# Patient Record
Sex: FEMALE | Race: WHITE | NOT HISPANIC OR LATINO
[De-identification: names, ages, dates, MRNs, and addresses within clinical notes are randomized per-mention and may not be internally consistent; named-entity substitution may affect disease eponyms.]

---

## 2022-10-26 DIAGNOSIS — Z86.39 PERSONAL HISTORY OF OTHER ENDOCRINE, NUTRITIONAL AND METABOLIC DISEASE: ICD-10-CM

## 2022-10-26 PROBLEM — Z00.00 ENCOUNTER FOR PREVENTIVE HEALTH EXAMINATION: Status: ACTIVE | Noted: 2022-10-26

## 2022-11-10 ENCOUNTER — NON-APPOINTMENT (OUTPATIENT)
Age: 72
End: 2022-11-10

## 2022-11-10 DIAGNOSIS — E66.9 OBESITY, UNSPECIFIED: ICD-10-CM

## 2022-11-10 DIAGNOSIS — K58.9 IRRITABLE BOWEL SYNDROME W/OUT DIARRHEA: ICD-10-CM

## 2023-03-10 ENCOUNTER — APPOINTMENT (OUTPATIENT)
Dept: ENDOCRINOLOGY | Facility: CLINIC | Age: 73
End: 2023-03-10
Payer: MEDICARE

## 2023-03-10 VITALS
OXYGEN SATURATION: 98 % | BODY MASS INDEX: 25.1 KG/M2 | WEIGHT: 147 LBS | RESPIRATION RATE: 18 BRPM | SYSTOLIC BLOOD PRESSURE: 100 MMHG | HEIGHT: 64 IN | HEART RATE: 69 BPM | DIASTOLIC BLOOD PRESSURE: 66 MMHG

## 2023-03-10 PROCEDURE — 99213 OFFICE O/P EST LOW 20 MIN: CPT

## 2023-03-10 NOTE — PHYSICAL EXAM
[Alert] : alert [Well Nourished] : well nourished [No Acute Distress] : no acute distress [Well Developed] : well developed [Normal Sclera/Conjunctiva] : normal sclera/conjunctiva [EOMI] : extra ocular movement intact [No Proptosis] : no proptosis [Normal Oropharynx] : the oropharynx was normal [Thyroid Not Enlarged] : the thyroid was not enlarged [No Thyroid Nodules] : no palpable thyroid nodules [No Respiratory Distress] : no respiratory distress [No Accessory Muscle Use] : no accessory muscle use [Clear to Auscultation] : lungs were clear to auscultation bilaterally [Normal S1, S2] : normal S1 and S2 [Normal Rate] : heart rate was normal [Regular Rhythm] : with a regular rhythm [No Edema] : no peripheral edema [Normal Bowel Sounds] : normal bowel sounds [Not Tender] : non-tender [Not Distended] : not distended [Soft] : abdomen soft [Normal Anterior Cervical Nodes] : no anterior cervical lymphadenopathy [Normal Posterior Cervical Nodes] : no posterior cervical lymphadenopathy [No Spinal Tenderness] : no spinal tenderness [Spine Straight] : spine straight [No Stigmata of Cushings Syndrome] : no stigmata of Cushings Syndrome [Normal Gait] : normal gait [Normal Strength/Tone] : muscle strength and tone were normal [No Rash] : no rash [Normal Reflexes] : deep tendon reflexes were 2+ and symmetric [No Tremors] : no tremors [Oriented x3] : oriented to person, place, and time [Acanthosis Nigricans] : no acanthosis nigricans [de-identified] : Distal pulses are 1+ bilaterally on the lower extremities

## 2023-03-10 NOTE — HISTORY OF PRESENT ILLNESS
[FreeTextEntry1] : 72-year-old white female with a past medical history of type 2 diabetes primary hypothyroidism, hypertension and hyperlipidemia presents for routine follow-up and evaluation.  Patient is currently taking glimepiride 2 mg tablets in the evening Ozempic 0.5 mg subcutaneously once a week levothyroxine 75 mcg tablets every day.  Patient denies any significant symptoms of polyuria or polydipsia.  Her glucose average for 7 days is 115 mg per DL 14-day average is 122 and 30-day average is 115 mg per DL.  Patient occasionally experiences hypoglycemi hypoglycemic episodes but now since she is off the  glimepiride in the morning she is feeling better review of systems essentially negative she denies any chest pain shortness of breath heartburn.  She recently had a cardiac evaluation which was reported to be normal.  Physically she is quite active and her vision has been stable.

## 2023-03-10 NOTE — ASSESSMENT
[Diabetes Foot Care] : diabetes foot care [Long Term Vascular Complications] : long term vascular complications of diabetes [Carbohydrate Consistent Diet] : carbohydrate consistent diet [Importance of Diet and Exercise] : importance of diet and exercise to improve glycemic control, achieve weight loss and improve cardiovascular health [Exercise/Effect on Glucose] : exercise/effect on glucose [Hypoglycemia Management] : hypoglycemia management [Self Monitoring of Blood Glucose] : self monitoring of blood glucose [Retinopathy Screening] : Patient was referred to ophthalmology for retinopathy screening [FreeTextEntry1] : Middle-aged white female who has a past medical history of type 2 diabetes and primary hypothyroidism who is currently stable.  She recently had a blood work done in December 2022 her hemoglobin A1c level was 6.2% urine for albumin was less than 3 complete metabolic panel is normal except for a creatinine of 1.18 her GFR is 48 lipid panel shows a total cholesterol of 171 and LDL of 86.  Recommendation\par 1.  I discussed in detail with the patient the results of her recent blood test.  She is soon due to have repeat blood analysis which should be including a TSH level.\par 2.  I have advised the patient to continue with the Ozempic 0.5 mg every week and the glimepiride 2 mg tablets every evening..  If the patient glucose level continue to drop then we may have to discontinue the glimepiride dose in the evening.\par 3.  The importance of diet and exercise was stressed upon the patient.\par 4.  Patient will return to the office in approximately 4 months time after repeat blood test.  The plan was discussed in detail with the patient thank you

## 2023-04-25 ENCOUNTER — OFFICE (OUTPATIENT)
Dept: URBAN - METROPOLITAN AREA CLINIC 105 | Facility: CLINIC | Age: 73
Setting detail: OPHTHALMOLOGY
End: 2023-04-25
Payer: MEDICARE

## 2023-04-25 DIAGNOSIS — H16.223: ICD-10-CM

## 2023-04-25 DIAGNOSIS — H90.3: ICD-10-CM

## 2023-04-25 DIAGNOSIS — Z96.1: ICD-10-CM

## 2023-04-25 DIAGNOSIS — E11.9: ICD-10-CM

## 2023-04-25 DIAGNOSIS — H43.393: ICD-10-CM

## 2023-04-25 DIAGNOSIS — H40.1131: ICD-10-CM

## 2023-04-25 PROCEDURE — 92567 TYMPANOMETRY: CPT | Performed by: AUDIOLOGIST-HEARING AID FITTER

## 2023-04-25 PROCEDURE — 92014 COMPRE OPH EXAM EST PT 1/>: CPT | Performed by: OPTOMETRIST

## 2023-04-25 PROCEDURE — 92133 CPTRZD OPH DX IMG PST SGM ON: CPT | Performed by: OPTOMETRIST

## 2023-04-25 PROCEDURE — 92557 COMPREHENSIVE HEARING TEST: CPT | Performed by: AUDIOLOGIST-HEARING AID FITTER

## 2023-04-25 ASSESSMENT — REFRACTION_AUTOREFRACTION
OD_AXIS: 152
OS_AXIS: 006
OS_SPHERE: +1.75
OS_CYLINDER: -2.75
OD_CYLINDER: -2.75
OD_SPHERE: +2.00

## 2023-04-25 ASSESSMENT — REFRACTION_MANIFEST
OD_SPHERE: +2.00
OD_AXIS: 154
OD_VA1: 20/20
OD_CYLINDER: -2.75
OS_SPHERE: +2.00
OS_AXIS: 007
OS_ADD: +2.75
OS_CYLINDER: -2.50
OD_ADD: +2.75
OS_VA1: 20/20

## 2023-04-25 ASSESSMENT — KERATOMETRY
OS_K1POWER_DIOPTERS: 44.00
OS_K2POWER_DIOPTERS: 47.00
OD_K2POWER_DIOPTERS: 46.00
OD_K1POWER_DIOPTERS: 43.25
OS_AXISANGLE_DEGREES: 097
OD_AXISANGLE_DEGREES: 069

## 2023-04-25 ASSESSMENT — SPHEQUIV_DERIVED
OD_SPHEQUIV: 0.625
OS_SPHEQUIV: 0.75
OD_SPHEQUIV: 0.625
OS_SPHEQUIV: 0.375

## 2023-04-25 ASSESSMENT — AXIALLENGTH_DERIVED
OS_AL: 22.6079
OS_AL: 22.7429
OD_AL: 22.953
OD_AL: 22.953

## 2023-04-25 ASSESSMENT — CORNEAL DYSTROPHY - POSTERIOR
OD_POSTERIORDYSTROPHY: T GUTTATA
OS_POSTERIORDYSTROPHY: T GUTTATA

## 2023-04-25 ASSESSMENT — CONFRONTATIONAL VISUAL FIELD TEST (CVF)
OS_FINDINGS: FULL
OD_FINDINGS: FULL

## 2023-04-25 ASSESSMENT — REFRACTION_CURRENTRX
OS_CYLINDER: -2.25
OD_SPHERE: +2.00
OD_ADD: +3.00
OD_AXIS: 155
OS_ADD: +2.75
OS_SPHERE: +1.75
OD_AXIS: 151
OS_OVR_VA: 20/
OD_SPHERE: +2.00
OS_VPRISM_DIRECTION: BF
OS_ADD: +3.00
OS_CYLINDER: -2.75
OS_OVR_VA: 20/
OD_ADD: +2.75
OD_AXIS: 150
OS_AXIS: 003
OD_ADD: +3.00
OD_VPRISM_DIRECTION: BF
OS_AXIS: 001
OD_OVR_VA: 20/
OS_VPRISM_DIRECTION: BF
OD_CYLINDER: -2.75
OD_SPHERE: +1.75
OD_OVR_VA: 20/
OS_VPRISM_DIRECTION: BF
OD_OVR_VA: 20/
OS_ADD: +3.00
OD_VPRISM_DIRECTION: BF
OS_SPHERE: +1.75
OD_CYLINDER: -2.50
OD_VPRISM_DIRECTION: BF
OD_CYLINDER: -2.75
OS_CYLINDER: -2.75
OS_AXIS: 005
OS_SPHERE: +2.00
OS_OVR_VA: 20/

## 2023-04-25 ASSESSMENT — VISUAL ACUITY
OD_BCVA: 20/25-1
OS_BCVA: 20/20

## 2023-04-25 ASSESSMENT — PACHYMETRY
OS_CT_UM: 545
OS_CT_CORRECTION: 0
OD_CT_UM: 522
OD_CT_CORRECTION: 1

## 2023-04-25 ASSESSMENT — SUPERFICIAL PUNCTATE KERATITIS (SPK)
OS_SPK: ABSENT
OD_SPK: ABSENT

## 2023-04-25 ASSESSMENT — TONOMETRY
OS_IOP_MMHG: 14
OD_IOP_MMHG: 14

## 2023-05-02 ENCOUNTER — APPOINTMENT (OUTPATIENT)
Dept: ORTHOPEDIC SURGERY | Facility: CLINIC | Age: 73
End: 2023-05-02
Payer: MEDICARE

## 2023-05-02 VITALS — HEIGHT: 64 IN | WEIGHT: 143 LBS | BODY MASS INDEX: 24.41 KG/M2

## 2023-05-02 DIAGNOSIS — M48.00 SPINAL STENOSIS, SITE UNSPECIFIED: ICD-10-CM

## 2023-05-02 DIAGNOSIS — Z78.9 OTHER SPECIFIED HEALTH STATUS: ICD-10-CM

## 2023-05-02 DIAGNOSIS — S39.012A STRAIN OF MUSCLE, FASCIA AND TENDON OF LOWER BACK, INITIAL ENCOUNTER: ICD-10-CM

## 2023-05-02 PROCEDURE — 99204 OFFICE O/P NEW MOD 45 MIN: CPT

## 2023-05-02 PROCEDURE — 73562 X-RAY EXAM OF KNEE 3: CPT | Mod: RT

## 2023-05-02 NOTE — DISCUSSION/SUMMARY
[de-identified] : Patient pain is coming from her back\par patient does not have pain in the hip joint\par POWER and TKA are not recommended at this time\par Recommend PT for stretching and strengthening

## 2023-05-02 NOTE — PHYSICAL EXAM
[5___] : hamstring 5[unfilled]/5 [] : patient ambulates without assistive device [AP] : anteroposterior [Right] : right knee [Lateral] : lateral [[place]] : skyline [FreeTextEntry8] : tender over gluteal musculature  [FreeTextEntry3] : minimally swollen [de-identified] : waist flexed gait [FreeTextEntry9] : no significant bony abnormalities or osteophytes  [TWNoteComboBox7] : flexion 130 degrees [de-identified] : extension 0 degrees

## 2023-05-02 NOTE — HISTORY OF PRESENT ILLNESS
[de-identified] : Date of Injury/Onset:      1/2023\par Pain: At Rest: 5 /10   \par With Activity:8 /10 \par Affecting Sleep:Y\par Difficulty with stairs:Y\par Difficulty getting in and out of car:Y\par Sit to stand stiffness:Y\par Mechanism of injury:  NKI\par This  is not a Work Related Injury being treated under Worker's Compensation.\par This  is not   an athletic injury occurring associated with an interscholastic or organized sports team.\par Quality of symptoms: C/O PAIN LATERAL SIDED PAIN AND POSTERIOR PAIN, NO SWELLING , INSTABILITY\par Improves with:    HEAT\par Worse with:    DRIVING, TURNING IN BED\par Previous Treatment/Imaging/Studies Since Last Visit: EPIDURALS IN SPINE, ALEVE FOR PAIN FOR HURT HER STOMACH\par Reports Available For Review Today: [facility]. \par \par \par  \par \par

## 2023-05-29 ENCOUNTER — FORM ENCOUNTER (OUTPATIENT)
Age: 73
End: 2023-05-29

## 2023-07-05 ENCOUNTER — FORM ENCOUNTER (OUTPATIENT)
Age: 73
End: 2023-07-05

## 2023-08-01 ENCOUNTER — RX RENEWAL (OUTPATIENT)
Age: 73
End: 2023-08-01

## 2023-08-08 ENCOUNTER — APPOINTMENT (OUTPATIENT)
Dept: ORTHOPEDIC SURGERY | Facility: CLINIC | Age: 73
End: 2023-08-08
Payer: MEDICARE

## 2023-08-08 DIAGNOSIS — M17.11 UNILATERAL PRIMARY OSTEOARTHRITIS, RIGHT KNEE: ICD-10-CM

## 2023-08-08 DIAGNOSIS — S76.311A STRAIN OF MUSCLE, FASCIA AND TENDON OF THE POSTERIOR MUSCLE GROUP AT THIGH LEVEL, RIGHT THIGH, INITIAL ENCOUNTER: ICD-10-CM

## 2023-08-08 DIAGNOSIS — M70.61 TROCHANTERIC BURSITIS, RIGHT HIP: ICD-10-CM

## 2023-08-08 DIAGNOSIS — M23.91 UNSPECIFIED INTERNAL DERANGEMENT OF RIGHT KNEE: ICD-10-CM

## 2023-08-08 DIAGNOSIS — S76.011A STRAIN OF MUSCLE, FASCIA AND TENDON OF RIGHT HIP, INITIAL ENCOUNTER: ICD-10-CM

## 2023-08-08 PROCEDURE — 99214 OFFICE O/P EST MOD 30 MIN: CPT

## 2023-08-08 NOTE — HISTORY OF PRESENT ILLNESS
[de-identified] : 8/8/23: patient here for f/u right knee pain.  went to 10 PT visits.   doing HEP.  c/o has to continue Mobic or Tylenol for pain in right lateral hip and patellar tendon area.     Feeling better but not 100%   5/2/23: Date of Injury/Onset: 1/2023 Pain: At Rest: 5 /10 With Activity:8 /10 Affecting Sleep:Y Difficulty with stairs:Y Difficulty getting in and out of car:Y Sit to stand stiffness:Y Mechanism of injury: NKI Quality of symptoms: C/O PAIN LATERAL SIDED PAIN AND POSTERIOR PAIN, NO SWELLING , INSTABILITY Improves with: HEAT Worse with: DRIVING, TURNING IN BED Previous Treatment/Imaging/Studies Since Last Visit: EPIDURALS IN SPINE, ALEVE FOR PAIN FOR HURT HER STOMACH Reports Available For Review Today: [facility].

## 2023-08-08 NOTE — PHYSICAL EXAM
[Right] : right knee [AP] : anteroposterior [Lateral] : lateral [[place]] : skyline [5___] : adduction 5[unfilled]/5 [FreeTextEntry8] : TTP over Gluteus medius  [de-identified] :  Pain w resisted ABD [de-identified] : ABLE - [] : lateral joint line tenderness [FreeTextEntry3] : minimally swollen [de-identified] : waist flexed gait [FreeTextEntry9] : no significant bony abnormalities or osteophytes  [TWNoteComboBox7] : flexion 130 degrees [de-identified] : extension 0 degrees

## 2023-08-08 NOTE — DISCUSSION/SUMMARY
[de-identified] :  Lengthy discussion regarding options was had with the patient. Nonsurgical options including but not limited to cortisone, viscosupplementation, anti-inflammatory medications, activity modification, non-impact exercise, maintaining a healthy BMI, bracing, and icing were reviewed. Surgical options including but not limited to arthroscopy, and joint replacement were discussed as was risks, benefits and alternatives. All questions were answered.   Plan at this time is for PT and HEP. Patient will cont. Meloxicam PRN   Patient was given Rx for MRI of the RIGHT KNEE   to further evaluate for both ligamentous, muscle and cartilaginous injury that is suspected due to physical examination. Patient also reports history of clicking/popping sensations, decreased ROM and strength, and a feeling of instability associated with this body part. Patient was instructed to f/u after imaging for review.

## 2023-08-10 ENCOUNTER — APPOINTMENT (OUTPATIENT)
Dept: ENDOCRINOLOGY | Facility: CLINIC | Age: 73
End: 2023-08-10
Payer: MEDICARE

## 2023-08-10 VITALS
SYSTOLIC BLOOD PRESSURE: 108 MMHG | TEMPERATURE: 96.2 F | BODY MASS INDEX: 24.59 KG/M2 | OXYGEN SATURATION: 99 % | HEIGHT: 64 IN | DIASTOLIC BLOOD PRESSURE: 64 MMHG | HEART RATE: 44 BPM | RESPIRATION RATE: 16 BRPM | WEIGHT: 144 LBS

## 2023-08-10 PROCEDURE — 99213 OFFICE O/P EST LOW 20 MIN: CPT

## 2023-08-10 RX ORDER — PANTOPRAZOLE 40 MG/1
40 TABLET, DELAYED RELEASE ORAL
Refills: 0 | Status: ACTIVE | COMMUNITY

## 2023-08-10 RX ORDER — QUINAPRIL HYDROCHLORIDE 20 MG/1
20 TABLET, FILM COATED ORAL
Refills: 0 | Status: DISCONTINUED | COMMUNITY
End: 2023-08-10

## 2023-08-10 RX ORDER — CEPHALEXIN 500 MG/1
500 CAPSULE ORAL
Refills: 0 | Status: ACTIVE | COMMUNITY

## 2023-08-10 RX ORDER — LOSARTAN POTASSIUM 50 MG/1
50 TABLET, FILM COATED ORAL
Refills: 0 | Status: ACTIVE | COMMUNITY

## 2023-08-10 RX ORDER — PNV NO.95/FERROUS FUM/FOLIC AC 28MG-0.8MG
TABLET ORAL
Refills: 0 | Status: ACTIVE | COMMUNITY

## 2023-08-10 RX ORDER — LEVOTHYROXINE SODIUM 0.07 MG/1
75 TABLET ORAL DAILY
Qty: 90 | Refills: 3 | Status: DISCONTINUED | COMMUNITY
Start: 2023-05-30 | End: 2023-08-10

## 2023-08-10 RX ORDER — LEVOTHYROXINE SODIUM 0.07 MG/1
75 TABLET ORAL
Refills: 0 | Status: DISCONTINUED | COMMUNITY
End: 2023-08-10

## 2023-08-10 RX ORDER — OMEPRAZOLE 40 MG/1
CAPSULE, DELAYED RELEASE ORAL
Refills: 0 | Status: DISCONTINUED | COMMUNITY
End: 2023-08-10

## 2023-08-10 RX ORDER — ASCORBIC ACID 500 MG
500 TABLET ORAL
Refills: 0 | Status: ACTIVE | COMMUNITY

## 2023-08-10 RX ORDER — LEVOTHYROXINE SODIUM 75 UG/1
75 TABLET ORAL DAILY
Qty: 90 | Refills: 1 | Status: DISCONTINUED | COMMUNITY
Start: 2022-10-26 | End: 2023-08-10

## 2023-08-10 RX ORDER — MELOXICAM 7.5 MG/1
7.5 TABLET ORAL TWICE DAILY
Qty: 30 | Refills: 0 | Status: DISCONTINUED | COMMUNITY
Start: 2023-05-02 | End: 2023-08-10

## 2023-08-10 RX ORDER — SEMAGLUTIDE 0.68 MG/ML
INJECTION, SOLUTION SUBCUTANEOUS
Refills: 0 | Status: DISCONTINUED | COMMUNITY
End: 2023-08-10

## 2023-08-10 RX ORDER — CALCIUM SENNOSIDES 25 MG/1
25 TABLET ORAL
Refills: 0 | Status: ACTIVE | COMMUNITY

## 2023-08-10 RX ORDER — HYDROCHLOROTHIAZIDE 12.5 MG/1
12.5 TABLET ORAL
Refills: 0 | Status: ACTIVE | COMMUNITY

## 2023-08-10 RX ORDER — SERTRALINE HYDROCHLORIDE 100 MG/1
100 TABLET, FILM COATED ORAL
Refills: 0 | Status: ACTIVE | COMMUNITY

## 2023-08-10 RX ORDER — DORZOLAMIDE HYDROCHLORIDE TIMOLOL MALEATE 20; 5 MG/ML; MG/ML
SOLUTION/ DROPS OPHTHALMIC
Refills: 0 | Status: ACTIVE | COMMUNITY

## 2023-08-10 NOTE — ASSESSMENT
[Diabetes Foot Care] : diabetes foot care [Long Term Vascular Complications] : long term vascular complications of diabetes [Carbohydrate Consistent Diet] : carbohydrate consistent diet [Importance of Diet and Exercise] : importance of diet and exercise to improve glycemic control, achieve weight loss and improve cardiovascular health [Exercise/Effect on Glucose] : exercise/effect on glucose [Hypoglycemia Management] : hypoglycemia management [Self Monitoring of Blood Glucose] : self monitoring of blood glucose [Retinopathy Screening] : Patient was referred to ophthalmology for retinopathy screening [FreeTextEntry1] : Middle-age white female who has a past medical history of type 2 diabetes, hypothyroidism and hypertension is currently stable with well compliance with diet.  She recently had a blood test done through her family doctor on May 3 which showed that the TSH level is elevated at 6.63 Free T4 was 1.25 previously her TSH was 7.65.  Her hemoglobin A1c level is 6.1% the albumin to creatinine ratio is within normal range.  Complete metabolic panel is normal except for a GFR of 49.  LDL is 84 triglycerides of 104 and total cholesterol of 167 mg per DL.  The above findings were discussed with the patient in detail.  She is slightly hypothyroid.  Her glycemic control is quite acceptable.  Patient has been off the Ozempic for the past few weeks.  Recommendation 1.  I have advised the patient to increase her levothyroxine to 88 mcg tablets every day 2.  I will also restart her on the Ozempic 0.5 mg subcutaneously once every week.  She will continue with the glimepiride 2 mg tablets daily in the evening. 3.  The importance of diet exercise and maintenance of normal weight was discussed with the patient. 4.  Patient recently had an eye examination which was reported to be normal. 5.  Patient will have a repeat blood test done in approximately 2 months time and if stable she will follow-up in our office in approximately 4 months.  The plan was discussed in detail with the patient.  Thank you

## 2023-08-10 NOTE — HISTORY OF PRESENT ILLNESS
[FreeTextEntry1] : Patient states she is currently in the donut hole and has not had medication Ozempic for 2 weeks. 72-year-old white woman for female with a past medical history of type 2 diabetes, primary hypothyroidism, hypertension and hyperlipidemia presents for routine follow-up and evaluation.  Patient is currently on glimepiride 2 mg tablets daily, Ozempic 0.5 mg every week.  However the patient has missed her Ozempic for the past few weeks secondary to her insurance conflicts.  She recently had an episode of severe right sciatic pain which was treated with local epidural shots.  Her sugar levels at home for 7-day average is 123 mg per DL 14-day average is 126 and the 30-day average is 131 mg per DL.  She denies any symptoms of hypoglycemia.  Her weight has been stable she has not noticed any significant polyuria polydipsia or nocturia.  Review of systems negative for chest pain shortness of breath nausea vomiting abdominal pain, numbness of extremities or any visual changes

## 2023-09-19 ENCOUNTER — APPOINTMENT (OUTPATIENT)
Dept: ORTHOPEDIC SURGERY | Facility: CLINIC | Age: 73
End: 2023-09-19
Payer: MEDICARE

## 2023-09-19 VITALS — BODY MASS INDEX: 24.59 KG/M2 | HEIGHT: 64 IN | WEIGHT: 144 LBS

## 2023-09-19 DIAGNOSIS — M76.31 ILIOTIBIAL BAND SYNDROME, RIGHT LEG: ICD-10-CM

## 2023-09-19 PROCEDURE — 99213 OFFICE O/P EST LOW 20 MIN: CPT

## 2023-11-01 ENCOUNTER — OFFICE (OUTPATIENT)
Dept: URBAN - METROPOLITAN AREA CLINIC 105 | Facility: CLINIC | Age: 73
Setting detail: OPHTHALMOLOGY
End: 2023-11-01
Payer: MEDICARE

## 2023-11-01 DIAGNOSIS — Z96.1: ICD-10-CM

## 2023-11-01 DIAGNOSIS — H26.492: ICD-10-CM

## 2023-11-01 DIAGNOSIS — H40.1131: ICD-10-CM

## 2023-11-01 DIAGNOSIS — H16.223: ICD-10-CM

## 2023-11-01 PROCEDURE — 92083 EXTENDED VISUAL FIELD XM: CPT | Performed by: OPTOMETRIST

## 2023-11-01 PROCEDURE — 92014 COMPRE OPH EXAM EST PT 1/>: CPT | Performed by: OPTOMETRIST

## 2023-11-01 PROCEDURE — 92250 FUNDUS PHOTOGRAPHY W/I&R: CPT | Performed by: OPTOMETRIST

## 2023-11-01 ASSESSMENT — CORNEAL DYSTROPHY - POSTERIOR
OS_POSTERIORDYSTROPHY: T GUTTATA
OD_POSTERIORDYSTROPHY: T GUTTATA

## 2023-11-01 ASSESSMENT — REFRACTION_CURRENTRX
OD_CYLINDER: -2.75
OS_VPRISM_DIRECTION: BF
OS_OVR_VA: 20/
OD_AXIS: 151
OS_CYLINDER: -2.50
OS_AXIS: 006
OS_OVR_VA: 20/
OS_ADD: +3.00
OD_OVR_VA: 20/
OS_SPHERE: +2.00
OD_VPRISM_DIRECTION: BF
OD_ADD: +3.00
OD_OVR_VA: 20/
OD_OVR_VA: 20/
OD_SPHERE: +2.00
OS_OVR_VA: 20/

## 2023-11-01 ASSESSMENT — REFRACTION_MANIFEST
OS_ADD: +2.75
OS_VA1: 20/20
OD_SPHERE: +2.00
OD_CYLINDER: -2.75
OS_SPHERE: +2.00
OD_ADD: +2.75
OD_AXIS: 154
OS_CYLINDER: -2.50
OS_AXIS: 007
OD_VA1: 20/20

## 2023-11-01 ASSESSMENT — SPHEQUIV_DERIVED
OD_SPHEQUIV: 0.875
OS_SPHEQUIV: 0.75
OD_SPHEQUIV: 0.625
OS_SPHEQUIV: 0.625

## 2023-11-01 ASSESSMENT — REFRACTION_AUTOREFRACTION
OD_CYLINDER: -2.75
OS_AXIS: 002
OS_SPHERE: +2.00
OD_SPHERE: +2.25
OD_AXIS: 148
OS_CYLINDER: -2.75

## 2023-11-01 ASSESSMENT — CONFRONTATIONAL VISUAL FIELD TEST (CVF)
OS_FINDINGS: FULL
OD_FINDINGS: FULL

## 2023-11-01 ASSESSMENT — SUPERFICIAL PUNCTATE KERATITIS (SPK)
OD_SPK: ABSENT
OS_SPK: ABSENT

## 2023-11-06 ENCOUNTER — RX RENEWAL (OUTPATIENT)
Age: 73
End: 2023-11-06

## 2023-11-06 RX ORDER — BLOOD SUGAR DIAGNOSTIC
STRIP MISCELLANEOUS
Qty: 90 | Refills: 3 | Status: DISCONTINUED | COMMUNITY
Start: 2023-01-18 | End: 2023-11-06

## 2023-11-06 RX ORDER — SEMAGLUTIDE 0.68 MG/ML
2 INJECTION, SOLUTION SUBCUTANEOUS
Qty: 3 | Refills: 0 | Status: DISCONTINUED | COMMUNITY
Start: 2023-04-18 | End: 2023-11-06

## 2023-12-08 LAB — HBA1C MFR BLD HPLC: 6.1

## 2023-12-14 ENCOUNTER — OFFICE (OUTPATIENT)
Dept: URBAN - METROPOLITAN AREA CLINIC 105 | Facility: CLINIC | Age: 73
Setting detail: OPHTHALMOLOGY
End: 2023-12-14
Payer: MEDICARE

## 2023-12-14 ENCOUNTER — RX ONLY (RX ONLY)
Age: 73
End: 2023-12-14

## 2023-12-14 DIAGNOSIS — H26.492: ICD-10-CM

## 2023-12-14 PROCEDURE — 66821 AFTER CATARACT LASER SURGERY: CPT | Mod: LT | Performed by: STUDENT IN AN ORGANIZED HEALTH CARE EDUCATION/TRAINING PROGRAM

## 2023-12-14 ASSESSMENT — SPHEQUIV_DERIVED
OS_SPHEQUIV: 0.75
OD_SPHEQUIV: 0.875
OD_SPHEQUIV: 0.625
OS_SPHEQUIV: 0.625

## 2023-12-14 ASSESSMENT — CORNEAL DYSTROPHY - POSTERIOR
OD_POSTERIORDYSTROPHY: T GUTTATA
OS_POSTERIORDYSTROPHY: T GUTTATA

## 2023-12-14 ASSESSMENT — REFRACTION_AUTOREFRACTION
OS_AXIS: 002
OD_AXIS: 148
OS_CYLINDER: -2.75
OD_CYLINDER: -2.75
OS_SPHERE: +2.00
OD_SPHERE: +2.25

## 2023-12-14 ASSESSMENT — REFRACTION_MANIFEST
OS_SPHERE: +2.00
OD_ADD: +2.75
OS_AXIS: 007
OS_ADD: +2.75
OD_VA1: 20/20
OD_SPHERE: +2.00
OD_AXIS: 154
OS_CYLINDER: -2.50
OD_CYLINDER: -2.75
OS_VA1: 20/20

## 2023-12-14 ASSESSMENT — REFRACTION_CURRENTRX
OS_ADD: +3.00
OS_VPRISM_DIRECTION: BF
OD_CYLINDER: -2.75
OS_SPHERE: +2.00
OS_CYLINDER: -2.50
OS_OVR_VA: 20/
OD_OVR_VA: 20/
OD_VPRISM_DIRECTION: BF
OD_SPHERE: +2.00
OD_ADD: +3.00
OS_AXIS: 006
OD_AXIS: 151

## 2023-12-14 ASSESSMENT — CONFRONTATIONAL VISUAL FIELD TEST (CVF)
OS_FINDINGS: FULL
OD_FINDINGS: FULL

## 2023-12-14 ASSESSMENT — SUPERFICIAL PUNCTATE KERATITIS (SPK)
OS_SPK: ABSENT
OD_SPK: ABSENT

## 2024-01-04 NOTE — HISTORY OF PRESENT ILLNESS
[FreeTextEntry1] : DANILO is a 73 year female who presents for pelvic organ prolapse. Reports dropped bladder.    US Abdomen 02/2023 - normal    Daytime frequency: Nocturia: Urinary urgency: Leakage of urine with urgency: Leakage of urine with coughing sneezing laughing: Incontinence pad use: Sensation of incomplete bladder emptying: History of frequent urinary tract infections: History of hematuria: Previous treatment: Vaginal symptoms: Bowel symptoms:     OB: GYN: history, pap, estrogen use PMH: PSH: Meds: Allx:

## 2024-01-04 NOTE — ADDENDUM
[FreeTextEntry1] : This note was written by [place], acting as the  for Dr. Thomas. This note accurately reflects the work and decisions made by Dr. Thomas.

## 2024-01-04 NOTE — PHYSICAL EXAM
[Chaperone Present] : A chaperone was present in the examining room during all aspects of the physical examination [No Acute Distress] : in no acute distress [Well developed] : well developed [Well Nourished] : ~L well nourished [Oriented x3] : oriented to person, place, and time [No Edema] : ~T edema was not present [Warm and Dry] : was warm and dry to touch [Vulvar Atrophy] : vulvar atrophy [Labia Majora] : were normal [Labia Minora] : were normal [Normal Appearance] : general appearance was normal [Atrophy] : atrophy [Normal] : no abnormalities [Post Void Residual ____ml] : post void residual was [unfilled] ml [Cough] : no cough [Tenderness] : ~T no ~M abdominal tenderness observed [Distended] : not distended

## 2024-01-04 NOTE — DISCUSSION/SUMMARY
[FreeTextEntry1] : DANILO is a 73 year female who presents for On exam, negative CST, normal PVR, no POP.  [] []  f/u  All questions answered.

## 2024-01-09 ENCOUNTER — OFFICE (OUTPATIENT)
Dept: URBAN - METROPOLITAN AREA CLINIC 113 | Facility: CLINIC | Age: 74
Setting detail: OPHTHALMOLOGY
End: 2024-01-09
Payer: MEDICARE

## 2024-01-09 ENCOUNTER — APPOINTMENT (OUTPATIENT)
Dept: UROGYNECOLOGY | Facility: CLINIC | Age: 74
End: 2024-01-09

## 2024-01-09 DIAGNOSIS — H26.492: ICD-10-CM

## 2024-01-09 DIAGNOSIS — H40.1131: ICD-10-CM

## 2024-01-09 DIAGNOSIS — H52.7: ICD-10-CM

## 2024-01-09 DIAGNOSIS — H16.223: ICD-10-CM

## 2024-01-09 DIAGNOSIS — E11.9: ICD-10-CM

## 2024-01-09 PROCEDURE — 92015 DETERMINE REFRACTIVE STATE: CPT | Performed by: STUDENT IN AN ORGANIZED HEALTH CARE EDUCATION/TRAINING PROGRAM

## 2024-01-09 PROCEDURE — 99024 POSTOP FOLLOW-UP VISIT: CPT | Performed by: STUDENT IN AN ORGANIZED HEALTH CARE EDUCATION/TRAINING PROGRAM

## 2024-01-09 ASSESSMENT — CORNEAL DYSTROPHY - POSTERIOR
OD_POSTERIORDYSTROPHY: T GUTTATA
OS_POSTERIORDYSTROPHY: T GUTTATA

## 2024-01-09 ASSESSMENT — REFRACTION_MANIFEST
OD_VA2: 20/20
OS_SPHERE: +2.00
OS_AXIS: 007
OS_ADD: +2.75
OD_SPHERE: +2.00
OD_CYLINDER: -2.75
OD_CYLINDER: -3.00
OD_VA1: 20/20
OD_ADD: +2.75
OS_SPHERE: +1.50
OD_AXIS: 154
OS_CYLINDER: -2.25
OS_VA2: 20/20
OS_ADD: +2.75
OS_AXIS: 005
OD_VA1: 20/20
OD_ADD: +2.75
OD_SPHERE: +2.25
OS_VA1: 20/20
OD_AXIS: 145
OS_CYLINDER: -2.50
OS_VA1: 20/25-

## 2024-01-09 ASSESSMENT — REFRACTION_CURRENTRX
OS_VPRISM_DIRECTION: BF
OS_SPHERE: +2.00
OS_ADD: +3.00
OS_AXIS: 006
OS_OVR_VA: 20/
OD_VPRISM_DIRECTION: BF
OD_ADD: +3.00
OS_CYLINDER: -2.50
OD_CYLINDER: -2.75
OD_SPHERE: +2.00
OD_OVR_VA: 20/
OD_AXIS: 151

## 2024-01-09 ASSESSMENT — CONFRONTATIONAL VISUAL FIELD TEST (CVF)
OS_FINDINGS: FULL
OD_FINDINGS: FULL

## 2024-01-09 ASSESSMENT — SPHEQUIV_DERIVED
OD_SPHEQUIV: 0.625
OD_SPHEQUIV: 0.75
OS_SPHEQUIV: 0.375
OS_SPHEQUIV: 0.75
OS_SPHEQUIV: 0.375
OD_SPHEQUIV: 0.75

## 2024-01-09 ASSESSMENT — REFRACTION_AUTOREFRACTION
OD_AXIS: 145
OD_SPHERE: +2.25
OS_AXIS: 005
OD_CYLINDER: -3.00
OS_SPHERE: +1.50
OS_CYLINDER: -2.25

## 2024-01-09 ASSESSMENT — SUPERFICIAL PUNCTATE KERATITIS (SPK)
OD_SPK: ABSENT
OS_SPK: ABSENT

## 2024-01-26 ENCOUNTER — APPOINTMENT (OUTPATIENT)
Dept: ENDOCRINOLOGY | Facility: CLINIC | Age: 74
End: 2024-01-26
Payer: MEDICARE

## 2024-01-26 VITALS
HEART RATE: 69 BPM | SYSTOLIC BLOOD PRESSURE: 100 MMHG | WEIGHT: 142 LBS | HEIGHT: 64 IN | DIASTOLIC BLOOD PRESSURE: 57 MMHG | BODY MASS INDEX: 24.24 KG/M2 | OXYGEN SATURATION: 99 % | TEMPERATURE: 97.2 F

## 2024-01-26 DIAGNOSIS — E78.5 HYPERLIPIDEMIA, UNSPECIFIED: ICD-10-CM

## 2024-01-26 DIAGNOSIS — E11.9 TYPE 2 DIABETES MELLITUS W/OUT COMPLICATIONS: ICD-10-CM

## 2024-01-26 DIAGNOSIS — E03.9 HYPOTHYROIDISM, UNSPECIFIED: ICD-10-CM

## 2024-01-26 PROCEDURE — 99214 OFFICE O/P EST MOD 30 MIN: CPT

## 2024-01-26 RX ORDER — MELOXICAM 7.5 MG/1
7.5 TABLET ORAL TWICE DAILY
Qty: 28 | Refills: 1 | Status: COMPLETED | COMMUNITY
Start: 2023-06-23 | End: 2024-01-26

## 2024-01-26 NOTE — PHYSICAL EXAM
[Alert] : alert [Well Nourished] : well nourished [No Acute Distress] : no acute distress [Normal Sclera/Conjunctiva] : normal sclera/conjunctiva [Well Developed] : well developed [EOMI] : extra ocular movement intact [No Proptosis] : no proptosis [Thyroid Not Enlarged] : the thyroid was not enlarged [Normal Oropharynx] : the oropharynx was normal [No Thyroid Nodules] : no palpable thyroid nodules [No Respiratory Distress] : no respiratory distress [No Accessory Muscle Use] : no accessory muscle use [Clear to Auscultation] : lungs were clear to auscultation bilaterally [Normal S1, S2] : normal S1 and S2 [Normal Rate] : heart rate was normal [Regular Rhythm] : with a regular rhythm [No Edema] : no peripheral edema [Normal Bowel Sounds] : normal bowel sounds [Pedal Pulses Normal] : the pedal pulses are present [Not Tender] : non-tender [Not Distended] : not distended [Soft] : abdomen soft [Normal Anterior Cervical Nodes] : no anterior cervical lymphadenopathy [Normal Posterior Cervical Nodes] : no posterior cervical lymphadenopathy [No Spinal Tenderness] : no spinal tenderness [Spine Straight] : spine straight [No Stigmata of Cushings Syndrome] : no stigmata of Cushings Syndrome [Normal Gait] : normal gait [No Rash] : no rash [Normal Strength/Tone] : muscle strength and tone were normal [Acanthosis Nigricans] : no acanthosis nigricans [Normal Reflexes] : deep tendon reflexes were 2+ and symmetric [No Tremors] : no tremors [Oriented x3] : oriented to person, place, and time

## 2024-01-26 NOTE — ASSESSMENT
[Diabetes Foot Care] : diabetes foot care [Long Term Vascular Complications] : long term vascular complications of diabetes [Carbohydrate Consistent Diet] : carbohydrate consistent diet [Exercise/Effect on Glucose] : exercise/effect on glucose [Hypoglycemia Management] : hypoglycemia management [Self Monitoring of Blood Glucose] : self monitoring of blood glucose [FreeTextEntry1] : Middle-aged white female with a past medical history of a type 2 diabetes and primary hypothyroidism currently on oral medication together with Ozempic.  Patient last blood test was performed on 22 December of 2023 which showed that the hemoglobin A1c was 6.3%, TSH was slightly elevated at 5.3 with a free T4 of 1.3, complete metabolic panel was normal, LDL is 72.  Patient is clinically stable with fair compliance with her diet but physically she does not exercise.  Also she has lost significant amount of weight and is stable commendation 1.  I have advised the patient to increase the levothyroxine to 100 mcg tablets every day 2.  Patient will continue with the current medication glimepiride 2 mg daily, Ozempic 0.25 mg subcutaneously once a week and atorvastatin 40 mg tablet daily. 3.  The importance of diet exercise and regular care of her feet and eyes was discussed with the patient. 4.  If her condition remained stable she will return to the office in approximately 4 months with a repeat blood test plan was discussed with the patient thank you [Retinopathy Screening] : Patient was referred to ophthalmology for retinopathy screening

## 2024-01-26 NOTE — HISTORY OF PRESENT ILLNESS
[FreeTextEntry1] : 73-year-old white female who has a past medical history of type 2 diabetes, primary hypothyroidism, hyperlipidemia and who is currently taking glimepiride 2 mg tablet daily, Ozempic 0.25 mg subcu every week atorvastatin and levothyroxine 88 mcg tablets daily.  Patient denies any significant symptoms.  She is able to tolerate the Ozempic but occasionally she gets mild nausea.  She denies any abdominal pain.  Her weight has been stable at about 140 pounds.  Physically she is not very active.  She has no symptoms of polyuria polydipsia or dysuria.  Her vision has been stable and there is no numbness or tingling of the extremities.  Review of systems is negative.  She is eating 3 meals a day.

## 2024-04-16 ENCOUNTER — RX RENEWAL (OUTPATIENT)
Age: 74
End: 2024-04-16

## 2024-05-09 ENCOUNTER — APPOINTMENT (OUTPATIENT)
Dept: ENDOCRINOLOGY | Facility: CLINIC | Age: 74
End: 2024-05-09
Payer: MEDICARE

## 2024-05-09 VITALS
TEMPERATURE: 97.3 F | HEIGHT: 64 IN | DIASTOLIC BLOOD PRESSURE: 62 MMHG | BODY MASS INDEX: 24.24 KG/M2 | SYSTOLIC BLOOD PRESSURE: 112 MMHG | WEIGHT: 142 LBS

## 2024-05-09 PROCEDURE — 99214 OFFICE O/P EST MOD 30 MIN: CPT

## 2024-05-09 PROCEDURE — G2211 COMPLEX E/M VISIT ADD ON: CPT

## 2024-05-09 RX ORDER — TROSPIUM CHLORIDE 20 MG/1
20 TABLET, FILM COATED ORAL
Refills: 0 | Status: ACTIVE | COMMUNITY

## 2024-05-09 RX ORDER — LEVOTHYROXINE SODIUM 0.09 MG/1
88 TABLET ORAL DAILY
Qty: 90 | Refills: 3 | Status: DISCONTINUED | COMMUNITY
Start: 2023-08-10 | End: 2024-05-09

## 2024-05-09 RX ORDER — MIRABEGRON 25 MG/1
25 TABLET, EXTENDED RELEASE ORAL
Refills: 0 | Status: ACTIVE | COMMUNITY

## 2024-05-09 RX ORDER — ESTRADIOL 0.03 MG/D
0.03 PATCH, EXTENDED RELEASE TRANSDERMAL
Refills: 0 | Status: ACTIVE | COMMUNITY

## 2024-05-09 NOTE — REVIEW OF SYSTEMS
[Fatigue] : no fatigue [Decreased Appetite] : appetite not decreased [Fever] : no fever [Chills] : no chills [Negative] : Heme/Lymph [FreeTextEntry2] : Her BMI is at 24.3 her height is 5 foot 4 and the weight is 142 pounds

## 2024-05-09 NOTE — ASSESSMENT
[Diabetes Foot Care] : diabetes foot care [Long Term Vascular Complications] : long term vascular complications of diabetes [Carbohydrate Consistent Diet] : carbohydrate consistent diet [Importance of Diet and Exercise] : importance of diet and exercise to improve glycemic control, achieve weight loss and improve cardiovascular health [Exercise/Effect on Glucose] : exercise/effect on glucose [Hypoglycemia Management] : hypoglycemia management [Retinopathy Screening] : Patient was referred to ophthalmology for retinopathy screening [FreeTextEntry1] : Middle-age white female with a past medical history of for type 2 diabetes which is currently well-controlled on Ozempic and glimepiride.  Patient recently had a blood test performed on April 27 thousand and 24 where hemoglobin A1c was 6.1%, TSH is normal at 0.728 urine is normal for albumin and complete metabolic panel is also normal.  Her GFR is 52.  Her direct LDL is 87, triglycerides of 95.  Patient apparently has lost significant weight after she was started on the and is holding her weight at approximately 140 pounds.  Recommendation 1.  I have has been advised to continue with the diabetic medications include glimepiride 2 mg daily and Ozempic 0.5 mg solution subcutaneously once a week. 2.  She will also continue with levothyroxine 100 mcg tablets daily with losartan 5050 mg tablets daily and as atorvastatin 40 mg daily. 3.  The importance of exercise and maintenance of ideal body weight was discussed with the patient in detail.  Patient to continue to maintain her current weight and if there is a tendency towards a decrease in her weight that we may have to consider lowering the  Ozempic dose, 4.  Patient will return to the office for follow-up evaluation in approximately 4 months time with repeat blood test.  The plan discussed with the patient thank you

## 2024-05-09 NOTE — HISTORY OF PRESENT ILLNESS
[FreeTextEntry1] : 73-year-old white female with a past medical history of type 2 diabetes and who is currently taking glimepiride 2 mg tablets daily in the morning and Ozempic 0.5 mg solution subcutaneously once a week.  Patient reports that his sugar levels have been averaging between 130 and 150 mg per DL.  Patient was successful in losing weight but recently she stopped compliant with the diet and has gained a few pounds.  She denies any signs or symptoms of hypoglycemia, numbness of extremities, and visual disturbances.  She occasionally experiences nausea nausea with mild abdominal pain.  Her bowel habits on normal except for constipation and her weight has remained at approximately 140 pounds.  Patient also has a past medical history of hyperlipidemia, hypothyroidism, irritable bowel syndrome, her current medications also include atorvastatin, eyedrops, hydrochlorothiazide 12.5 mg daily daily with oxygen 100 mcg tablets daily losartan 50 mg tablets daily pantoprazole daily sertraline and vitamin supplements.  Denies any chest pains headache blurry vision dizziness nausea vomiting numbness of her extremities.

## 2024-05-30 RX ORDER — SEMAGLUTIDE 0.68 MG/ML
2 INJECTION, SOLUTION SUBCUTANEOUS
Qty: 9 | Refills: 0 | Status: ACTIVE | COMMUNITY
Start: 2023-11-06 | End: 1900-01-01

## 2024-05-30 RX ORDER — GLIMEPIRIDE 2 MG/1
2 TABLET ORAL
Qty: 180 | Refills: 0 | Status: ACTIVE | COMMUNITY
Start: 2022-10-26 | End: 1900-01-01

## 2024-05-30 RX ORDER — BLOOD SUGAR DIAGNOSTIC
STRIP MISCELLANEOUS
Qty: 1 | Refills: 0 | Status: ACTIVE | COMMUNITY
Start: 2023-11-06 | End: 1900-01-01

## 2024-05-30 RX ORDER — LEVOTHYROXINE SODIUM 0.1 MG/1
100 TABLET ORAL DAILY
Qty: 90 | Refills: 0 | Status: ACTIVE | COMMUNITY
Start: 2024-01-26 | End: 1900-01-01

## 2024-05-30 RX ORDER — LANCETS 33 GAUGE
EACH MISCELLANEOUS
Qty: 1 | Refills: 0 | Status: ACTIVE | COMMUNITY
Start: 2023-11-06 | End: 1900-01-01

## 2024-05-30 RX ORDER — ATORVASTATIN CALCIUM 40 MG/1
40 TABLET, FILM COATED ORAL
Qty: 90 | Refills: 3 | Status: ACTIVE | COMMUNITY
Start: 1900-01-01 | End: 1900-01-01

## 2024-07-25 ENCOUNTER — OFFICE (OUTPATIENT)
Dept: URBAN - METROPOLITAN AREA CLINIC 105 | Facility: CLINIC | Age: 74
Setting detail: OPHTHALMOLOGY
End: 2024-07-25
Payer: MEDICARE

## 2024-07-25 DIAGNOSIS — H16.223: ICD-10-CM

## 2024-07-25 DIAGNOSIS — H43.393: ICD-10-CM

## 2024-07-25 DIAGNOSIS — H26.492: ICD-10-CM

## 2024-07-25 DIAGNOSIS — H40.1131: ICD-10-CM

## 2024-07-25 PROBLEM — H43.812 POSTERIOR VITREOUS DETACHMENT; LEFT EYE: Status: ACTIVE | Noted: 2024-07-25

## 2024-07-25 PROCEDURE — 92014 COMPRE OPH EXAM EST PT 1/>: CPT | Performed by: STUDENT IN AN ORGANIZED HEALTH CARE EDUCATION/TRAINING PROGRAM

## 2024-07-25 ASSESSMENT — CONFRONTATIONAL VISUAL FIELD TEST (CVF)
OD_FINDINGS: FULL
OS_FINDINGS: FULL

## 2024-10-03 ENCOUNTER — NON-APPOINTMENT (OUTPATIENT)
Age: 74
End: 2024-10-03

## 2024-10-03 DIAGNOSIS — N18.30 CHRONIC KIDNEY DISEASE, STAGE 3 UNSPECIFIED: ICD-10-CM

## 2024-10-03 DIAGNOSIS — I10 ESSENTIAL (PRIMARY) HYPERTENSION: ICD-10-CM

## 2024-10-03 DIAGNOSIS — R80.9 PROTEINURIA, UNSPECIFIED: ICD-10-CM

## 2024-10-31 ENCOUNTER — APPOINTMENT (OUTPATIENT)
Dept: ENDOCRINOLOGY | Facility: CLINIC | Age: 74
End: 2024-10-31

## 2024-12-04 ENCOUNTER — OFFICE (OUTPATIENT)
Dept: URBAN - METROPOLITAN AREA CLINIC 105 | Facility: CLINIC | Age: 74
Setting detail: OPHTHALMOLOGY
End: 2024-12-04
Payer: MEDICARE

## 2024-12-04 DIAGNOSIS — H40.1131: ICD-10-CM

## 2024-12-04 PROCEDURE — 99213 OFFICE O/P EST LOW 20 MIN: CPT | Performed by: STUDENT IN AN ORGANIZED HEALTH CARE EDUCATION/TRAINING PROGRAM

## 2024-12-04 PROCEDURE — 92083 EXTENDED VISUAL FIELD XM: CPT | Performed by: STUDENT IN AN ORGANIZED HEALTH CARE EDUCATION/TRAINING PROGRAM

## 2024-12-04 PROCEDURE — 92133 CPTRZD OPH DX IMG PST SGM ON: CPT | Performed by: STUDENT IN AN ORGANIZED HEALTH CARE EDUCATION/TRAINING PROGRAM

## 2024-12-04 ASSESSMENT — CONFRONTATIONAL VISUAL FIELD TEST (CVF)
OS_FINDINGS: FULL
OD_FINDINGS: FULL

## 2024-12-04 ASSESSMENT — REFRACTION_MANIFEST
OS_SPHERE: +1.50
OS_AXIS: 007
OS_ADD: +2.75
OD_CYLINDER: -2.75
OS_VA1: 20/25-
OD_AXIS: 154
OS_VA2: 20/20
OD_VA2: 20/20
OD_VA1: 20/20
OS_SPHERE: +2.00
OS_CYLINDER: -2.50
OS_AXIS: 005
OD_SPHERE: +2.00
OD_ADD: +2.75
OS_VA1: 20/20
OD_AXIS: 145
OD_VA1: 20/20
OD_CYLINDER: -3.00
OD_SPHERE: +2.25
OD_ADD: +2.75
OS_ADD: +2.75
OS_CYLINDER: -2.25

## 2024-12-04 ASSESSMENT — REFRACTION_CURRENTRX
OS_VPRISM_DIRECTION: BF
OD_VPRISM_DIRECTION: BF
OS_ADD: +3.00
OD_AXIS: 146
OS_SPHERE: +1.50
OD_OVR_VA: 20/
OS_OVR_VA: 20/
OD_SPHERE: +2.25
OS_AXIS: 006
OD_ADD: +3.00
OS_CYLINDER: -2.50
OD_CYLINDER: -3.00

## 2024-12-04 ASSESSMENT — TONOMETRY
OS_IOP_MMHG: 16
OD_IOP_MMHG: 16
OS_IOP_MMHG: 15
OD_IOP_MMHG: 15

## 2024-12-04 ASSESSMENT — CORNEAL DYSTROPHY - POSTERIOR
OD_POSTERIORDYSTROPHY: T GUTTATA
OS_POSTERIORDYSTROPHY: T GUTTATA

## 2024-12-04 ASSESSMENT — KERATOMETRY
OS_K1POWER_DIOPTERS: 44.00
OS_K2POWER_DIOPTERS: 47.00
OD_K1POWER_DIOPTERS: 43.25
OS_AXISANGLE_DEGREES: 099
OD_AXISANGLE_DEGREES: 065
OD_K2POWER_DIOPTERS: 46.25

## 2024-12-04 ASSESSMENT — VISUAL ACUITY
OS_BCVA: 20/30
OD_BCVA: 20/40+

## 2024-12-04 ASSESSMENT — PACHYMETRY
OD_CT_UM: 522
OS_CT_UM: 545
OD_CT_CORRECTION: 1
OS_CT_CORRECTION: 0

## 2024-12-04 ASSESSMENT — SUPERFICIAL PUNCTATE KERATITIS (SPK)
OS_SPK: ABSENT
OD_SPK: ABSENT

## 2024-12-04 ASSESSMENT — REFRACTION_AUTOREFRACTION
OD_SPHERE: +1.75
OD_CYLINDER: -2.50
OD_AXIS: 148
OS_CYLINDER: -2.25
OS_AXIS: 006
OS_SPHERE: +1.75

## 2025-01-22 ENCOUNTER — APPOINTMENT (OUTPATIENT)
Age: 75
End: 2025-01-22
Payer: MEDICARE

## 2025-01-22 VITALS
WEIGHT: 130 LBS | DIASTOLIC BLOOD PRESSURE: 69 MMHG | HEART RATE: 68 BPM | SYSTOLIC BLOOD PRESSURE: 109 MMHG | HEIGHT: 64 IN | BODY MASS INDEX: 22.2 KG/M2

## 2025-01-22 DIAGNOSIS — R80.9 PROTEINURIA, UNSPECIFIED: ICD-10-CM

## 2025-01-22 DIAGNOSIS — I10 ESSENTIAL (PRIMARY) HYPERTENSION: ICD-10-CM

## 2025-01-22 DIAGNOSIS — N18.30 CHRONIC KIDNEY DISEASE, STAGE 3 UNSPECIFIED: ICD-10-CM

## 2025-01-22 DIAGNOSIS — E78.5 HYPERLIPIDEMIA, UNSPECIFIED: ICD-10-CM

## 2025-01-22 DIAGNOSIS — E11.9 TYPE 2 DIABETES MELLITUS W/OUT COMPLICATIONS: ICD-10-CM

## 2025-01-22 PROCEDURE — 99214 OFFICE O/P EST MOD 30 MIN: CPT

## 2025-01-22 PROCEDURE — 99204 OFFICE O/P NEW MOD 45 MIN: CPT

## 2025-01-23 ENCOUNTER — RX ONLY (RX ONLY)
Age: 75
End: 2025-01-23

## 2025-01-23 ENCOUNTER — OFFICE (OUTPATIENT)
Dept: URBAN - METROPOLITAN AREA CLINIC 105 | Facility: CLINIC | Age: 75
Setting detail: OPHTHALMOLOGY
End: 2025-01-23
Payer: MEDICARE

## 2025-01-23 DIAGNOSIS — H40.1131: ICD-10-CM

## 2025-01-23 PROCEDURE — 99213 OFFICE O/P EST LOW 20 MIN: CPT | Performed by: STUDENT IN AN ORGANIZED HEALTH CARE EDUCATION/TRAINING PROGRAM

## 2025-01-23 ASSESSMENT — REFRACTION_MANIFEST
OS_SPHERE: +1.50
OS_VA1: 20/20
OD_AXIS: 145
OS_AXIS: 005
OS_SPHERE: +2.00
OD_ADD: +2.75
OS_ADD: +2.75
OD_VA1: 20/20
OD_SPHERE: +2.25
OS_VA1: 20/25-
OS_VA2: 20/20
OS_CYLINDER: -2.50
OD_CYLINDER: -2.75
OS_ADD: +2.75
OD_ADD: +2.75
OS_CYLINDER: -2.25
OD_CYLINDER: -3.00
OD_SPHERE: +2.00
OD_AXIS: 154
OD_VA2: 20/20
OS_AXIS: 007
OD_VA1: 20/20

## 2025-01-23 ASSESSMENT — REFRACTION_CURRENTRX
OS_ADD: +3.00
OD_ADD: +3.00
OD_SPHERE: +2.25
OS_VPRISM_DIRECTION: BF
OS_SPHERE: +1.50
OD_OVR_VA: 20/
OD_CYLINDER: -3.00
OD_AXIS: 146
OD_VPRISM_DIRECTION: BF
OS_OVR_VA: 20/
OS_AXIS: 006
OS_CYLINDER: -2.50

## 2025-01-23 ASSESSMENT — KERATOMETRY
OS_K1POWER_DIOPTERS: 44.00
OD_AXISANGLE_DEGREES: 063
OD_K2POWER_DIOPTERS: 46.50
OD_K1POWER_DIOPTERS: 43.50
OS_K2POWER_DIOPTERS: 46.75
OS_AXISANGLE_DEGREES: 097

## 2025-01-23 ASSESSMENT — REFRACTION_AUTOREFRACTION
OD_CYLINDER: -2.50
OS_AXIS: 006
OD_SPHERE: +2.00
OD_AXIS: 146
OS_CYLINDER: -2.00
OS_SPHERE: +2.00

## 2025-01-23 ASSESSMENT — PACHYMETRY
OD_CT_CORRECTION: 1
OS_CT_UM: 545
OS_CT_CORRECTION: 0
OD_CT_UM: 522

## 2025-01-23 ASSESSMENT — VISUAL ACUITY
OD_BCVA: 20/40+
OS_BCVA: 20/30

## 2025-01-23 ASSESSMENT — TONOMETRY
OD_IOP_MMHG: 17
OS_IOP_MMHG: 17

## 2025-01-23 ASSESSMENT — CONFRONTATIONAL VISUAL FIELD TEST (CVF)
OD_FINDINGS: FULL
OS_FINDINGS: FULL

## 2025-01-23 ASSESSMENT — CORNEAL DYSTROPHY - POSTERIOR
OS_POSTERIORDYSTROPHY: T GUTTATA
OD_POSTERIORDYSTROPHY: T GUTTATA

## 2025-01-23 ASSESSMENT — SUPERFICIAL PUNCTATE KERATITIS (SPK)
OS_SPK: ABSENT
OD_SPK: ABSENT

## 2025-02-27 ENCOUNTER — APPOINTMENT (OUTPATIENT)
Dept: ORTHOPEDIC SURGERY | Facility: CLINIC | Age: 75
End: 2025-02-27
Payer: MEDICARE

## 2025-02-27 VITALS — WEIGHT: 128 LBS | BODY MASS INDEX: 21.85 KG/M2 | HEIGHT: 64 IN

## 2025-02-27 DIAGNOSIS — M25.552 PAIN IN LEFT HIP: ICD-10-CM

## 2025-02-27 DIAGNOSIS — M24.052: ICD-10-CM

## 2025-02-27 DIAGNOSIS — M70.62 TROCHANTERIC BURSITIS, LEFT HIP: ICD-10-CM

## 2025-02-27 DIAGNOSIS — S76.012A STRAIN OF MUSCLE, FASCIA AND TENDON OF LEFT HIP, INITIAL ENCOUNTER: ICD-10-CM

## 2025-02-27 PROCEDURE — 99214 OFFICE O/P EST MOD 30 MIN: CPT

## 2025-02-27 PROCEDURE — 73502 X-RAY EXAM HIP UNI 2-3 VIEWS: CPT

## 2025-03-27 ENCOUNTER — OFFICE (OUTPATIENT)
Dept: URBAN - METROPOLITAN AREA CLINIC 105 | Facility: CLINIC | Age: 75
Setting detail: OPHTHALMOLOGY
End: 2025-03-27
Payer: MEDICARE

## 2025-03-27 ENCOUNTER — APPOINTMENT (OUTPATIENT)
Dept: ORTHOPEDIC SURGERY | Facility: CLINIC | Age: 75
End: 2025-03-27
Payer: MEDICARE

## 2025-03-27 VITALS — WEIGHT: 128 LBS | HEIGHT: 64 IN | BODY MASS INDEX: 21.85 KG/M2

## 2025-03-27 DIAGNOSIS — M70.62 TROCHANTERIC BURSITIS, LEFT HIP: ICD-10-CM

## 2025-03-27 DIAGNOSIS — S76.012A STRAIN OF MUSCLE, FASCIA AND TENDON OF LEFT HIP, INITIAL ENCOUNTER: ICD-10-CM

## 2025-03-27 DIAGNOSIS — M25.552 PAIN IN LEFT HIP: ICD-10-CM

## 2025-03-27 DIAGNOSIS — H40.1131: ICD-10-CM

## 2025-03-27 PROCEDURE — 99213 OFFICE O/P EST LOW 20 MIN: CPT | Performed by: STUDENT IN AN ORGANIZED HEALTH CARE EDUCATION/TRAINING PROGRAM

## 2025-03-27 PROCEDURE — 99214 OFFICE O/P EST MOD 30 MIN: CPT

## 2025-03-27 RX ORDER — MELOXICAM 15 MG/1
15 TABLET ORAL
Qty: 21 | Refills: 0 | Status: ACTIVE | COMMUNITY
Start: 2025-03-27 | End: 1900-01-01

## 2025-03-27 ASSESSMENT — REFRACTION_MANIFEST
OD_AXIS: 145
OS_ADD: +2.75
OD_VA1: 20/20
OD_SPHERE: +2.00
OS_AXIS: 005
OS_SPHERE: +2.00
OS_ADD: +2.75
OD_CYLINDER: -3.00
OD_SPHERE: +2.25
OD_VA1: 20/20
OS_AXIS: 007
OD_CYLINDER: -2.75
OD_ADD: +2.75
OS_CYLINDER: -2.25
OD_ADD: +2.75
OS_VA1: 20/25-
OS_SPHERE: +1.50
OS_VA1: 20/20
OD_AXIS: 154
OS_VA2: 20/20
OS_CYLINDER: -2.50
OD_VA2: 20/20

## 2025-03-27 ASSESSMENT — VISUAL ACUITY
OS_BCVA: 20/30-2
OD_BCVA: 20/25-1

## 2025-03-27 ASSESSMENT — REFRACTION_AUTOREFRACTION
OD_SPHERE: +1.75
OS_SPHERE: +1.50
OS_CYLINDER: -2.00
OS_AXIS: 011
OD_CYLINDER: -2.75
OD_AXIS: 150

## 2025-03-27 ASSESSMENT — KERATOMETRY
OS_AXISANGLE_DEGREES: 106
OD_K2POWER_DIOPTERS: 46.75
OD_AXISANGLE_DEGREES: 068
OS_K1POWER_DIOPTERS: 43.75
OD_K1POWER_DIOPTERS: 43.50
OS_K2POWER_DIOPTERS: 46.75

## 2025-03-27 ASSESSMENT — PACHYMETRY
OS_CT_CORRECTION: 0
OD_CT_UM: 522
OS_CT_UM: 545
OD_CT_CORRECTION: 1

## 2025-03-27 ASSESSMENT — CORNEAL DYSTROPHY - POSTERIOR
OD_POSTERIORDYSTROPHY: T GUTTATA
OS_POSTERIORDYSTROPHY: T GUTTATA

## 2025-03-27 ASSESSMENT — REFRACTION_CURRENTRX
OS_OVR_VA: 20/
OS_ADD: +3.00
OD_AXIS: 146
OD_SPHERE: +2.25
OD_VPRISM_DIRECTION: BF
OD_ADD: +3.00
OS_CYLINDER: -2.50
OD_OVR_VA: 20/
OS_SPHERE: +1.50
OS_AXIS: 006
OD_CYLINDER: -3.00
OS_VPRISM_DIRECTION: BF

## 2025-03-27 ASSESSMENT — TONOMETRY
OD_IOP_MMHG: 14
OS_IOP_MMHG: 13
OS_IOP_MMHG: 14
OD_IOP_MMHG: 10

## 2025-03-27 ASSESSMENT — SUPERFICIAL PUNCTATE KERATITIS (SPK)
OS_SPK: ABSENT
OD_SPK: ABSENT

## 2025-03-27 ASSESSMENT — CONFRONTATIONAL VISUAL FIELD TEST (CVF)
OS_FINDINGS: FULL
OD_FINDINGS: FULL

## 2025-06-09 ENCOUNTER — RX RENEWAL (OUTPATIENT)
Age: 75
End: 2025-06-09

## 2025-07-23 ENCOUNTER — APPOINTMENT (OUTPATIENT)
Age: 75
End: 2025-07-23
Payer: MEDICARE

## 2025-07-23 VITALS
HEART RATE: 67 BPM | BODY MASS INDEX: 20.66 KG/M2 | DIASTOLIC BLOOD PRESSURE: 67 MMHG | WEIGHT: 121 LBS | HEIGHT: 64 IN | SYSTOLIC BLOOD PRESSURE: 104 MMHG

## 2025-07-23 DIAGNOSIS — N18.30 CHRONIC KIDNEY DISEASE, STAGE 3 UNSPECIFIED: ICD-10-CM

## 2025-07-23 DIAGNOSIS — I10 ESSENTIAL (PRIMARY) HYPERTENSION: ICD-10-CM

## 2025-07-23 DIAGNOSIS — R80.9 PROTEINURIA, UNSPECIFIED: ICD-10-CM

## 2025-07-23 PROCEDURE — 99214 OFFICE O/P EST MOD 30 MIN: CPT

## 2025-07-23 RX ORDER — SITAGLIPTIN 50 MG/1
50 TABLET, FILM COATED ORAL
Refills: 0 | Status: ACTIVE | COMMUNITY

## 2025-07-23 RX ORDER — METFORMIN HYDROCHLORIDE 500 MG/1
500 TABLET, COATED ORAL
Refills: 0 | Status: ACTIVE | COMMUNITY

## 2025-09-05 RX ORDER — LOSARTAN POTASSIUM 50 MG/1
50 TABLET, FILM COATED ORAL DAILY
Qty: 90 | Refills: 3 | Status: ACTIVE | COMMUNITY
Start: 2025-09-05 | End: 1900-01-01